# Patient Record
Sex: MALE | Race: WHITE | NOT HISPANIC OR LATINO | URBAN - METROPOLITAN AREA
[De-identification: names, ages, dates, MRNs, and addresses within clinical notes are randomized per-mention and may not be internally consistent; named-entity substitution may affect disease eponyms.]

---

## 2020-02-03 ENCOUNTER — OFFICE VISIT CONVERTED (OUTPATIENT)
Dept: FAMILY MEDICINE CLINIC | Facility: CLINIC | Age: 60
End: 2020-02-03
Attending: NURSE PRACTITIONER

## 2021-05-15 VITALS
HEIGHT: 70 IN | OXYGEN SATURATION: 94 % | TEMPERATURE: 97.9 F | DIASTOLIC BLOOD PRESSURE: 108 MMHG | HEART RATE: 76 BPM | WEIGHT: 294.25 LBS | BODY MASS INDEX: 42.13 KG/M2 | SYSTOLIC BLOOD PRESSURE: 180 MMHG

## 2024-04-29 ENCOUNTER — OFFICE VISIT (OUTPATIENT)
Dept: FAMILY MEDICINE CLINIC | Facility: CLINIC | Age: 64
End: 2024-04-29
Payer: MEDICAID

## 2024-04-29 VITALS
TEMPERATURE: 97.9 F | HEART RATE: 52 BPM | HEIGHT: 70 IN | BODY MASS INDEX: 30.21 KG/M2 | OXYGEN SATURATION: 96 % | WEIGHT: 211 LBS | SYSTOLIC BLOOD PRESSURE: 154 MMHG | DIASTOLIC BLOOD PRESSURE: 76 MMHG

## 2024-04-29 DIAGNOSIS — M54.50 LOW BACK PAIN, UNSPECIFIED BACK PAIN LATERALITY, UNSPECIFIED CHRONICITY, UNSPECIFIED WHETHER SCIATICA PRESENT: ICD-10-CM

## 2024-04-29 DIAGNOSIS — K59.09 CHRONIC CONSTIPATION: ICD-10-CM

## 2024-04-29 DIAGNOSIS — Z76.89 ENCOUNTER TO ESTABLISH CARE: ICD-10-CM

## 2024-04-29 DIAGNOSIS — M51.36 DDD (DEGENERATIVE DISC DISEASE), LUMBAR: ICD-10-CM

## 2024-04-29 DIAGNOSIS — E78.2 MIXED HYPERLIPIDEMIA: ICD-10-CM

## 2024-04-29 DIAGNOSIS — I10 HYPERTENSION, UNSPECIFIED TYPE: Primary | ICD-10-CM

## 2024-04-29 PROCEDURE — 99204 OFFICE O/P NEW MOD 45 MIN: CPT | Performed by: STUDENT IN AN ORGANIZED HEALTH CARE EDUCATION/TRAINING PROGRAM

## 2024-04-29 PROCEDURE — 1159F MED LIST DOCD IN RCRD: CPT | Performed by: STUDENT IN AN ORGANIZED HEALTH CARE EDUCATION/TRAINING PROGRAM

## 2024-04-29 PROCEDURE — 1160F RVW MEDS BY RX/DR IN RCRD: CPT | Performed by: STUDENT IN AN ORGANIZED HEALTH CARE EDUCATION/TRAINING PROGRAM

## 2024-04-29 RX ORDER — NEBIVOLOL 10 MG/1
10 TABLET ORAL DAILY
COMMUNITY
End: 2024-04-29 | Stop reason: SDUPTHER

## 2024-04-29 RX ORDER — VALSARTAN 80 MG/1
80 TABLET ORAL DAILY
Qty: 30 TABLET | Refills: 1 | Status: SHIPPED | OUTPATIENT
Start: 2024-04-29 | End: 2024-05-29

## 2024-04-29 RX ORDER — POLYETHYLENE GLYCOL 3350 17 G/17G
17 POWDER, FOR SOLUTION ORAL DAILY PRN
Qty: 527 G | Refills: 1 | Status: SHIPPED | OUTPATIENT
Start: 2024-04-29 | End: 2024-05-02 | Stop reason: SDUPTHER

## 2024-04-29 RX ORDER — CYCLOBENZAPRINE HCL 5 MG
5 TABLET ORAL NIGHTLY PRN
Qty: 30 TABLET | Refills: 0 | Status: SHIPPED | OUTPATIENT
Start: 2024-04-29

## 2024-04-29 RX ORDER — ROSUVASTATIN CALCIUM 40 MG/1
40 TABLET, COATED ORAL NIGHTLY
COMMUNITY
Start: 2024-04-05 | End: 2024-04-29 | Stop reason: SDUPTHER

## 2024-04-29 RX ORDER — ROSUVASTATIN CALCIUM 40 MG/1
40 TABLET, COATED ORAL NIGHTLY
Qty: 90 TABLET | Refills: 1 | Status: SHIPPED | OUTPATIENT
Start: 2024-04-29 | End: 2024-07-28

## 2024-04-29 RX ORDER — NEBIVOLOL 10 MG/1
10 TABLET ORAL DAILY
Qty: 90 TABLET | Refills: 1 | Status: SHIPPED | OUTPATIENT
Start: 2024-04-29 | End: 2024-07-28

## 2024-04-29 NOTE — PROGRESS NOTES
"Chief Complaint  Establish Care (New patient is here for back pain ) and Constipation (Patient states he has been constipated since the back pain start a week ago. Intermittent h/0 hemmorides and denies no blood in stooles.)    Subjective      Constipation        José Antonio Rabago is a 63 y.o. male who presents to [facility] with PMH of HLD, HTN, presents to establish care.  Pt states he had a MI back in 2001, and has been on the current medication list since than.  Patient complains of lumbago he has had multiple back surgeries and discectomies, the last one being in 2020.  He also complains of recent onset constipation he was previously on MiraLAX but has not been on it for over 6 months.        Objective   Vital Signs:   Vitals:    04/29/24 1341   BP: 154/76   BP Location: Left arm   Patient Position: Sitting   Cuff Size: Adult   Pulse: 52   Temp: 97.9 °F (36.6 °C)   TempSrc: Oral   SpO2: 96%   Weight: 95.7 kg (211 lb)   Height: 177.8 cm (70\")   PainSc:   7   PainLoc: Abdomen     Body mass index is 30.28 kg/m².    Wt Readings from Last 3 Encounters:   04/29/24 95.7 kg (211 lb)   02/03/20 133 kg (294 lb 4 oz)     BP Readings from Last 3 Encounters:   04/29/24 154/76   02/03/20 (!) 180/108       Health Maintenance   Topic Date Due    COLORECTAL CANCER SCREENING  Never done    TDAP/TD VACCINES (1 - Tdap) Never done    RSV Vaccine - Adults (1 - 1-dose 60+ series) Never done    COVID-19 Vaccine (5 - 2023-24 season) 09/01/2023    LIPID PANEL  01/23/2024    HEPATITIS C SCREENING  Never done    ANNUAL PHYSICAL  Never done    INFLUENZA VACCINE  08/01/2024    BMI FOLLOWUP  04/29/2025    ZOSTER VACCINE  Completed    Pneumococcal Vaccine 0-64  Aged Out       Physical Exam  Constitutional:       General: He is not in acute distress.     Appearance: Normal appearance. He is not toxic-appearing.   HENT:      Head: Normocephalic and atraumatic.      Right Ear: Tympanic membrane normal.      Left " Ear: Tympanic membrane normal.      Nose: Nose normal.      Mouth/Throat:      Mouth: Mucous membranes are moist.   Eyes:      General: No scleral icterus.     Extraocular Movements: Extraocular movements intact.      Conjunctiva/sclera: Conjunctivae normal.      Pupils: Pupils are equal, round, and reactive to light.   Cardiovascular:      Rate and Rhythm: Normal rate and regular rhythm.      Pulses: Normal pulses.      Heart sounds: Normal heart sounds. No murmur heard.     No gallop.   Pulmonary:      Effort: Pulmonary effort is normal. No respiratory distress.   Abdominal:      General: Abdomen is flat. Bowel sounds are normal. There is no distension.      Palpations: Abdomen is soft.   Musculoskeletal:         General: Normal range of motion.      Cervical back: Normal range of motion.   Skin:     General: Skin is warm and dry.      Capillary Refill: Capillary refill takes less than 2 seconds.   Neurological:      General: No focal deficit present.      Mental Status: He is alert.   Psychiatric:         Mood and Affect: Mood normal.          Result Review :     The following data was reviewed by: Darren Pascal MD on 04/29/2024:      Procedures          Diagnoses and all orders for this visit:    1. Hypertension, unspecified type (Primary)  -     nebivolol (BYSTOLIC) 10 MG tablet; Take 1 tablet by mouth Daily for 90 days.  Dispense: 90 tablet; Refill: 1  -     valsartan (Diovan) 80 MG tablet; Take 1 tablet by mouth Daily for 30 days.  Dispense: 30 tablet; Refill: 1    2. Mixed hyperlipidemia  -     rosuvastatin (CRESTOR) 40 MG tablet; Take 1 tablet by mouth Every Night for 90 days.  Dispense: 90 tablet; Refill: 1    3. Encounter to establish care    4. DDD (degenerative disc disease), lumbar    5. Low back pain, unspecified back pain laterality, unspecified chronicity, unspecified whether sciatica present  -     cyclobenzaprine (FLEXERIL) 5 MG tablet; Take 1 tablet by mouth At Night As Needed for Muscle Spasms.   Dispense: 30 tablet; Refill: 0    6. Chronic constipation  -     polyethylene glycol (GlycoLax) 17 GM/SCOOP powder; Take 17 g by mouth Daily As Needed (constipation).  Dispense: 527 g; Refill: 1    Start Diovan we will see him back in 4 weeks for annual physical and labs.    BMI is >= 30 and <35. (Class 1 Obesity). The following options were offered after discussion;: weight loss educational material (shared in after visit summary), exercise counseling/recommendations, nutrition counseling/recommendations, and pharmacological intervention options         FOLLOW UP  Return in about 4 weeks (around 5/27/2024).  Patient was given instructions and counseling regarding his condition or for health maintenance advice. Please see specific information pulled into the AVS if appropriate.       Darren Pascal MD  04/29/24  14:12 EDT    CURRENT & DISCONTINUED MEDICATIONS  Current Outpatient Medications   Medication Instructions    cyclobenzaprine (FLEXERIL) 5 mg, Oral, Nightly PRN    LOW-DOSE ASPIRIN PO 81 mg, Oral, Daily    nebivolol (BYSTOLIC) 10 mg, Oral, Daily    polyethylene glycol (GLYCOLAX) 17 g, Oral, Daily PRN    rosuvastatin (CRESTOR) 40 mg, Oral, Nightly    valsartan (DIOVAN) 80 mg, Oral, Daily       Medications Discontinued During This Encounter   Medication Reason    nebivolol (BYSTOLIC) 10 MG tablet Reorder    rosuvastatin (CRESTOR) 40 MG tablet Reorder       need for outpatient follow-up/return to ED if symptoms worsen, persist or questions arise/radiology results

## 2024-05-02 ENCOUNTER — TELEPHONE (OUTPATIENT)
Dept: FAMILY MEDICINE CLINIC | Facility: CLINIC | Age: 64
End: 2024-05-02
Payer: MEDICAID

## 2024-05-02 DIAGNOSIS — K59.09 CHRONIC CONSTIPATION: ICD-10-CM

## 2024-05-02 RX ORDER — POLYETHYLENE GLYCOL 3350 17 G/17G
17 POWDER, FOR SOLUTION ORAL DAILY PRN
Qty: 527 G | Refills: 1 | Status: SHIPPED | OUTPATIENT
Start: 2024-05-02

## 2024-05-02 NOTE — TELEPHONE ENCOUNTER
Caller: José Antonio Rabago    Relationship: Self    Best call back number: [phone]    Which medication are you concerned about: polyethylene glycol (GlycoLax) 17 GM/SCOOP powder     Who prescribed you this medication: MD JARRELL     When did you start taking this medication: 2 DAYS AGO    What are your concerns: PATIENT STATED THIS IS CAUSING HIM TO HAVE CRAMPING AND ISN'T HELPING HIM PASS BOWELS. WOULD LIKE A DIFFERENT MEDICATION.

## 2024-05-15 ENCOUNTER — OFFICE VISIT (OUTPATIENT)
Dept: FAMILY MEDICINE CLINIC | Facility: CLINIC | Age: 64
End: 2024-05-15
Payer: MEDICAID

## 2024-05-15 VITALS
HEIGHT: 70 IN | OXYGEN SATURATION: 98 % | SYSTOLIC BLOOD PRESSURE: 130 MMHG | BODY MASS INDEX: 29.2 KG/M2 | DIASTOLIC BLOOD PRESSURE: 86 MMHG | HEART RATE: 86 BPM | TEMPERATURE: 97.7 F | WEIGHT: 204 LBS

## 2024-05-15 DIAGNOSIS — Z13.220 LIPID SCREENING: ICD-10-CM

## 2024-05-15 DIAGNOSIS — K59.09 CHRONIC CONSTIPATION: ICD-10-CM

## 2024-05-15 DIAGNOSIS — Z12.11 COLON CANCER SCREENING: ICD-10-CM

## 2024-05-15 DIAGNOSIS — R10.13 EPIGASTRIC ABDOMINAL PAIN: ICD-10-CM

## 2024-05-15 DIAGNOSIS — Z00.00 ANNUAL PHYSICAL EXAM: Primary | ICD-10-CM

## 2024-05-15 DIAGNOSIS — E78.2 MIXED HYPERLIPIDEMIA: ICD-10-CM

## 2024-05-15 DIAGNOSIS — Z11.59 NEED FOR HEPATITIS C SCREENING TEST: ICD-10-CM

## 2024-05-15 LAB
ALBUMIN SERPL-MCNC: 4.9 G/DL (ref 3.5–5.2)
ALBUMIN/GLOB SERPL: 1.6 G/DL
ALP SERPL-CCNC: 62 U/L (ref 39–117)
ALT SERPL W P-5'-P-CCNC: 21 U/L (ref 1–41)
ANION GAP SERPL CALCULATED.3IONS-SCNC: 10 MMOL/L (ref 5–15)
AST SERPL-CCNC: 22 U/L (ref 1–40)
BASOPHILS # BLD AUTO: 0.05 10*3/MM3 (ref 0–0.2)
BASOPHILS NFR BLD AUTO: 0.9 % (ref 0–1.5)
BILIRUB SERPL-MCNC: 0.7 MG/DL (ref 0–1.2)
BUN SERPL-MCNC: 9 MG/DL (ref 8–23)
BUN/CREAT SERPL: 10.7 (ref 7–25)
CALCIUM SPEC-SCNC: 9.8 MG/DL (ref 8.6–10.5)
CHLORIDE SERPL-SCNC: 102 MMOL/L (ref 98–107)
CHOLEST SERPL-MCNC: 114 MG/DL (ref 0–200)
CO2 SERPL-SCNC: 26 MMOL/L (ref 22–29)
CREAT SERPL-MCNC: 0.84 MG/DL (ref 0.76–1.27)
DEPRECATED RDW RBC AUTO: 38.6 FL (ref 37–54)
EGFRCR SERPLBLD CKD-EPI 2021: 98 ML/MIN/1.73
EOSINOPHIL # BLD AUTO: 0.07 10*3/MM3 (ref 0–0.4)
EOSINOPHIL NFR BLD AUTO: 1.2 % (ref 0.3–6.2)
ERYTHROCYTE [DISTWIDTH] IN BLOOD BY AUTOMATED COUNT: 12.4 % (ref 12.3–15.4)
GLOBULIN UR ELPH-MCNC: 3.1 GM/DL
GLUCOSE SERPL-MCNC: 101 MG/DL (ref 65–99)
HCT VFR BLD AUTO: 42.8 % (ref 37.5–51)
HCV AB SER QL: NORMAL
HDLC SERPL-MCNC: 44 MG/DL (ref 40–60)
HGB BLD-MCNC: 14.4 G/DL (ref 13–17.7)
IMM GRANULOCYTES # BLD AUTO: 0.01 10*3/MM3 (ref 0–0.05)
IMM GRANULOCYTES NFR BLD AUTO: 0.2 % (ref 0–0.5)
LDLC SERPL CALC-MCNC: 52 MG/DL (ref 0–100)
LDLC/HDLC SERPL: 1.16 {RATIO}
LYMPHOCYTES # BLD AUTO: 1.2 10*3/MM3 (ref 0.7–3.1)
LYMPHOCYTES NFR BLD AUTO: 20.7 % (ref 19.6–45.3)
MCH RBC QN AUTO: 29.1 PG (ref 26.6–33)
MCHC RBC AUTO-ENTMCNC: 33.6 G/DL (ref 31.5–35.7)
MCV RBC AUTO: 86.6 FL (ref 79–97)
MONOCYTES # BLD AUTO: 0.32 10*3/MM3 (ref 0.1–0.9)
MONOCYTES NFR BLD AUTO: 5.5 % (ref 5–12)
NEUTROPHILS NFR BLD AUTO: 4.16 10*3/MM3 (ref 1.7–7)
NEUTROPHILS NFR BLD AUTO: 71.5 % (ref 42.7–76)
NRBC BLD AUTO-RTO: 0 /100 WBC (ref 0–0.2)
PLATELET # BLD AUTO: 186 10*3/MM3 (ref 140–450)
PMV BLD AUTO: 11.3 FL (ref 6–12)
POTASSIUM SERPL-SCNC: 4.4 MMOL/L (ref 3.5–5.2)
PROT SERPL-MCNC: 8 G/DL (ref 6–8.5)
RBC # BLD AUTO: 4.94 10*6/MM3 (ref 4.14–5.8)
SODIUM SERPL-SCNC: 138 MMOL/L (ref 136–145)
TRIGL SERPL-MCNC: 94 MG/DL (ref 0–150)
TSH SERPL DL<=0.05 MIU/L-ACNC: 0.48 UIU/ML (ref 0.27–4.2)
VLDLC SERPL-MCNC: 18 MG/DL (ref 5–40)
WBC NRBC COR # BLD AUTO: 5.81 10*3/MM3 (ref 3.4–10.8)

## 2024-05-15 PROCEDURE — 2014F MENTAL STATUS ASSESS: CPT | Performed by: STUDENT IN AN ORGANIZED HEALTH CARE EDUCATION/TRAINING PROGRAM

## 2024-05-15 PROCEDURE — 1159F MED LIST DOCD IN RCRD: CPT | Performed by: STUDENT IN AN ORGANIZED HEALTH CARE EDUCATION/TRAINING PROGRAM

## 2024-05-15 PROCEDURE — 1125F AMNT PAIN NOTED PAIN PRSNT: CPT | Performed by: STUDENT IN AN ORGANIZED HEALTH CARE EDUCATION/TRAINING PROGRAM

## 2024-05-15 PROCEDURE — 86803 HEPATITIS C AB TEST: CPT | Performed by: STUDENT IN AN ORGANIZED HEALTH CARE EDUCATION/TRAINING PROGRAM

## 2024-05-15 PROCEDURE — 85025 COMPLETE CBC W/AUTO DIFF WBC: CPT | Performed by: STUDENT IN AN ORGANIZED HEALTH CARE EDUCATION/TRAINING PROGRAM

## 2024-05-15 PROCEDURE — 80061 LIPID PANEL: CPT | Performed by: STUDENT IN AN ORGANIZED HEALTH CARE EDUCATION/TRAINING PROGRAM

## 2024-05-15 PROCEDURE — 36415 COLL VENOUS BLD VENIPUNCTURE: CPT | Performed by: STUDENT IN AN ORGANIZED HEALTH CARE EDUCATION/TRAINING PROGRAM

## 2024-05-15 PROCEDURE — 99396 PREV VISIT EST AGE 40-64: CPT | Performed by: STUDENT IN AN ORGANIZED HEALTH CARE EDUCATION/TRAINING PROGRAM

## 2024-05-15 PROCEDURE — 80053 COMPREHEN METABOLIC PANEL: CPT | Performed by: STUDENT IN AN ORGANIZED HEALTH CARE EDUCATION/TRAINING PROGRAM

## 2024-05-15 PROCEDURE — 1160F RVW MEDS BY RX/DR IN RCRD: CPT | Performed by: STUDENT IN AN ORGANIZED HEALTH CARE EDUCATION/TRAINING PROGRAM

## 2024-05-15 PROCEDURE — 84443 ASSAY THYROID STIM HORMONE: CPT | Performed by: STUDENT IN AN ORGANIZED HEALTH CARE EDUCATION/TRAINING PROGRAM

## 2024-05-15 NOTE — PROGRESS NOTES
"Chief Complaint  Abdominal Pain (Had hernia surgery about 10 years ago and is having that pain in the area of where they did the surgery. His back was bothering him and he was told to start doing abdominal exercises for the back thinks he might have pulled something from surgery) and Bloated    Subjective      History of Present Illness    José Antonio Rabago is a 63 y.o. male who presents to [facility]   With recent complain of constipation on and off for sx months presents for followup today, he has used enema, and suppositories. He was started on miralax for about week and he complained of abdominal pain.  Patient has a history of abdominal umbilical hernia that was repaired in 2015 via mesh.  He complains today of some epigastric pain that started after he had his enema and a large bowel movement.  He also reports while taking polyethylene glycol he had some pain after taking it and the pain is mainly in the epigastric region.  He denies any nausea vomiting fever body aches at this time.      Objective   Vital Signs:   Vitals:    05/15/24 0740   BP: 130/86   Pulse: 86   Temp: 97.7 °F (36.5 °C)   SpO2: 98%   Weight: 92.5 kg (204 lb)   Height: 177.8 cm (70\")     Body mass index is 29.27 kg/m².    Wt Readings from Last 3 Encounters:   05/15/24 92.5 kg (204 lb)   04/29/24 95.7 kg (211 lb)   02/03/20 133 kg (294 lb 4 oz)     BP Readings from Last 3 Encounters:   05/15/24 130/86   04/29/24 154/76   02/03/20 (!) 180/108       Health Maintenance   Topic Date Due    COLORECTAL CANCER SCREENING  Never done    LIPID PANEL  01/23/2024    HEPATITIS C SCREENING  Never done    ANNUAL PHYSICAL  Never done    COVID-19 Vaccine (5 - 2023-24 season) 05/17/2024 (Originally 9/1/2023)    RSV Vaccine - Adults (1 - 1-dose 60+ series) 05/15/2025 (Originally 8/2/2020)    TDAP/TD VACCINES (1 - Tdap) 05/15/2025 (Originally 8/2/1979)    INFLUENZA VACCINE  08/01/2024    BMI FOLLOWUP  04/29/2025    ZOSTER " VACCINE  Completed    Pneumococcal Vaccine 0-64  Aged Out       Physical Exam   General:  AAO x3, no acute distress.  Eyes:  EOMI, PERRLA  Ears/Nose/Mouth/Throat:  Moist mucous membranes  Respiratory:  CTA bilaterally, no wheezes rales or rhonchi  Cardiovascular:  RRR no murmur rubs or gallops  Gastrointestinal: Tender in the epigastric region today, no rebound no rigidity abdomen soft nondistended nontender bowel sounds present x4 quadrants  Musculoskeletal:  Moves all 4 extremities spontaneously, no apparent weakness  Skin:  Warm, dry, no skin rashes or lesions  Neurologic:  AAO x3, cranial nerves 2-12 grossly intact, no focal neuro deficits  Psychiatric:  Normal mood and affect    Result Review :     The following data was reviewed by: Darren Pascal MD on 05/15/2024:      Procedures          Diagnoses and all orders for this visit:    1. Annual physical exam (Primary)  -     Comprehensive Metabolic Panel  -     CBC & Differential  -     TSH  -     Lipid Panel    2. Chronic constipation  -     linaclotide (Linzess) 145 MCG capsule capsule; Take 1 capsule by mouth Every Morning Before Breakfast.  Dispense: 30 capsule; Refill: 5    3. Lipid screening  -     Lipid Panel    4. Colon cancer screening  -     Ambulatory Referral For Screening Colonoscopy    5. Need for hepatitis C screening test  -     Hepatitis C antibody; Future  -     Hepatitis C antibody    6. Mixed hyperlipidemia  -     Lipid Panel    7. Epigastric abdominal pain  -     CT Abdomen Pelvis Without Contrast; Future       Obtain CT abdomen pelvis due to tenderness in the epigastrium.  Patient continues to have some constipation intermittently we will go ahead and prescribe Linzess because he cannot tolerate polyethylene glycol and is already tried over-the-counter bisacodyl suppositories.            FOLLOW UP  No follow-ups on file.  Patient was given instructions and counseling regarding his condition or for health maintenance advice. Please see  specific information pulled into the AVS if appropriate.       Darren Pascal MD  05/15/24  09:31 EDT    CURRENT & DISCONTINUED MEDICATIONS  Current Outpatient Medications   Medication Instructions    cyclobenzaprine (FLEXERIL) 5 mg, Oral, Nightly PRN    linaclotide (LINZESS) 145 mcg, Oral, Every Morning Before Breakfast    LOW-DOSE ASPIRIN PO 81 mg, Oral, Daily    nebivolol (BYSTOLIC) 10 mg, Oral, Daily    polyethylene glycol (GLYCOLAX) 17 g, Oral, Daily PRN    rosuvastatin (CRESTOR) 40 mg, Oral, Nightly    valsartan (DIOVAN) 80 mg, Oral, Daily       There are no discontinued medications.   Answers submitted by the patient for this visit:  Primary Reason for Visit (Submitted on 5/14/2024)  What is the primary reason for your visit?: Abdominal Pain

## 2024-05-21 ENCOUNTER — PRIOR AUTHORIZATION (OUTPATIENT)
Dept: FAMILY MEDICINE CLINIC | Facility: CLINIC | Age: 64
End: 2024-05-21
Payer: MEDICAID

## 2024-05-21 ENCOUNTER — TELEPHONE (OUTPATIENT)
Dept: GASTROENTEROLOGY | Facility: CLINIC | Age: 64
End: 2024-05-21
Payer: MEDICAID

## 2024-05-21 NOTE — TELEPHONE ENCOUNTER
GLENDA AGUILA SENT TO INS. - waiting on outcome                    5/21/2024  Denied today  This request has not been approved. Based on the information sent for review, the requested drug did not meet our guideline rules. To get the request approved, your doctor needs to show that you have met the guideline rules below. If you have questions, please call your doctor. In some cases, the requested drug or alternatives offered may have other guideline rules that need to be met. Our guideline named GI MOTILITY AGENTS requires the following rule(s) be met for approval: The member has ONE of the following diagnoses: 1. Chronic idiopathic constipation (CIC) [a type of digestive disorder]. 2. Irritable bowel syndrome with constipation (IBS-C) [a type of bowel disease]. 3. Functional constipation.Your doctor told us you have a diagnosis of constipation (difficulty passing stool. We do not have records or chart notes showing you have one of the diagnoses listed above. This is why your request is denied. Please work with your provider to use a different medication or get us more information if it will allow us to approve this request. A written notification letter will follow with additional details.

## 2024-05-21 NOTE — TELEPHONE ENCOUNTER
Hub staff attempted to follow warm transfer process and was unsuccessful     Caller: José Antonio Rabago    Relationship to patient: Self    Best call back number: [phone]    Patient is needing: PT CALLED TO SCHEDULE FROM REFERRAL// PLEASE CLL PT BACK

## 2024-05-23 ENCOUNTER — PREP FOR SURGERY (OUTPATIENT)
Dept: OTHER | Facility: HOSPITAL | Age: 64
End: 2024-05-23
Payer: MEDICAID

## 2024-05-23 ENCOUNTER — CLINICAL SUPPORT (OUTPATIENT)
Dept: GASTROENTEROLOGY | Facility: CLINIC | Age: 64
End: 2024-05-23
Payer: MEDICAID

## 2024-05-23 DIAGNOSIS — Z12.11 SCREENING FOR MALIGNANT NEOPLASM OF COLON: Primary | ICD-10-CM

## 2024-05-23 NOTE — PROGRESS NOTES
José Antonio Rabago  1960  63 y.o.    Reason for call: Screening Colonoscopy    Prep prescribed: Chon --- Patient reports issues with constipation (his PCP is managing). I have mailed him low-residue diet, tips to make bowel prep more tolerable and our 4L bowel prep instructions.     Prep instructions reviewed with patient and sent to patient via regular mail to the home address on file    Clearance needed? No    Is the patient currently on any injectable medications for weight loss or diabetes? No      Family history of colon cancer? No  If yes, indicate relative:     The patient has been scheduled for: Colonoscopy  Procedure Date: 7.24.24 --- pt prefers to be added to our cancellation list  Family History   Problem Relation Age of Onset    Cancer Mother         Passed in 1985 from Cancer    COPD Mother         Heavy smoker    Colon cancer Neg Hx      Past Medical History:   Diagnosis Date    Allergic     Penicillin and Sulfa    Coronary artery disease 2000    Heart Attack 1999    Hyperlipidemia 2000    Hypertension 2000    Low back pain 1991    Discectomy Surgery in 1991 and 2000    Myocardial infarction 2001    Neuromuscular disorder 2005    Right shoulder and neck nerve damage    Obesity 2008    Weight reached 322lbs. Now @212     Allergies   Allergen Reactions    Penicillins Anaphylaxis    Lisinopril Other (See Comments) and Palpitations     Unstable B/P.    Hydralazine Other (See Comments)     CAUSED ELEVATED HEART RATE AND BP    Hydrochlorothiazide Swelling    Sulfa Antibiotics Rash     Past Surgical History:   Procedure Laterality Date    HERNIA REPAIR  2006    Hernia Surgery w/mesh repair    INGUINAL HERNIA REPAIR  2006    SPINE SURGERY  1991/2000    UMBILICAL HERNIA REPAIR  2006    Surgery w/ mesh     Social History     Socioeconomic History    Marital status:    Tobacco Use    Smoking status: Never    Smokeless tobacco: Never   Vaping Use    Vaping status: Never Used   Substance and Sexual  Activity    Alcohol use: Yes     Alcohol/week: 3.0 standard drinks of alcohol     Types: 3 Cans of beer per week     Comment: Drink a couple of beers occasionally    Drug use: Never    Sexual activity: Not Currently     Partners: Female     Birth control/protection: Other       Current Outpatient Medications:     cyclobenzaprine (FLEXERIL) 5 MG tablet, Take 1 tablet by mouth At Night As Needed for Muscle Spasms., Disp: 30 tablet, Rfl: 0    LOW-DOSE ASPIRIN PO, Take 81 mg by mouth Daily., Disp: , Rfl:     nebivolol (BYSTOLIC) 10 MG tablet, Take 1 tablet by mouth Daily for 90 days., Disp: 90 tablet, Rfl: 1    rosuvastatin (CRESTOR) 40 MG tablet, Take 1 tablet by mouth Every Night for 90 days., Disp: 90 tablet, Rfl: 1    linaclotide (Linzess) 145 MCG capsule capsule, Take 1 capsule by mouth Every Morning Before Breakfast. (Patient not taking: Reported on 5/23/2024), Disp: 30 capsule, Rfl: 5    polyethylene glycol (GlycoLax) 17 GM/SCOOP powder, Take 17 g by mouth Daily As Needed (constipation). (Patient not taking: Reported on 5/15/2024), Disp: 527 g, Rfl: 1    valsartan (Diovan) 80 MG tablet, Take 1 tablet by mouth Daily for 30 days. (Patient not taking: Reported on 5/23/2024), Disp: 30 tablet, Rfl: 1

## 2024-05-24 ENCOUNTER — HOSPITAL ENCOUNTER (OUTPATIENT)
Dept: CT IMAGING | Facility: HOSPITAL | Age: 64
Discharge: HOME | End: 2024-05-24
Payer: MEDICAID

## 2024-05-24 DIAGNOSIS — R10.13 EPIGASTRIC ABDOMINAL PAIN: ICD-10-CM

## 2024-05-24 PROCEDURE — 74176 CT ABD & PELVIS W/O CONTRAST: CPT

## 2024-06-06 ENCOUNTER — OFFICE VISIT (OUTPATIENT)
Dept: FAMILY MEDICINE CLINIC | Facility: CLINIC | Age: 64
End: 2024-06-06
Payer: MEDICAID

## 2024-06-06 VITALS
OXYGEN SATURATION: 98 % | TEMPERATURE: 97.5 F | DIASTOLIC BLOOD PRESSURE: 86 MMHG | WEIGHT: 202.4 LBS | HEART RATE: 56 BPM | BODY MASS INDEX: 28.98 KG/M2 | SYSTOLIC BLOOD PRESSURE: 146 MMHG | RESPIRATION RATE: 16 BRPM | HEIGHT: 70 IN

## 2024-06-06 DIAGNOSIS — R10.13 EPIGASTRIC PAIN: ICD-10-CM

## 2024-06-06 DIAGNOSIS — K59.09 CHRONIC CONSTIPATION: ICD-10-CM

## 2024-06-06 DIAGNOSIS — R91.1 RIGHT LOWER LOBE PULMONARY NODULE: Primary | ICD-10-CM

## 2024-06-06 DIAGNOSIS — K21.9 GASTROESOPHAGEAL REFLUX DISEASE WITHOUT ESOPHAGITIS: ICD-10-CM

## 2024-06-06 PROCEDURE — 1160F RVW MEDS BY RX/DR IN RCRD: CPT | Performed by: STUDENT IN AN ORGANIZED HEALTH CARE EDUCATION/TRAINING PROGRAM

## 2024-06-06 PROCEDURE — 99214 OFFICE O/P EST MOD 30 MIN: CPT | Performed by: STUDENT IN AN ORGANIZED HEALTH CARE EDUCATION/TRAINING PROGRAM

## 2024-06-06 PROCEDURE — 1125F AMNT PAIN NOTED PAIN PRSNT: CPT | Performed by: STUDENT IN AN ORGANIZED HEALTH CARE EDUCATION/TRAINING PROGRAM

## 2024-06-06 PROCEDURE — 1159F MED LIST DOCD IN RCRD: CPT | Performed by: STUDENT IN AN ORGANIZED HEALTH CARE EDUCATION/TRAINING PROGRAM

## 2024-06-06 RX ORDER — PANTOPRAZOLE SODIUM 40 MG/1
40 TABLET, DELAYED RELEASE ORAL DAILY
Qty: 90 TABLET | Refills: 0 | Status: SHIPPED | OUTPATIENT
Start: 2024-06-06 | End: 2024-09-04

## 2024-06-06 NOTE — PROGRESS NOTES
"Chief Complaint  Follow-up (On CT scan )    Subjective      History of Present Illness    José Antonio Rabago is a 63 y.o. male who presents to [facility]   With a result of constipation for the past 6 months he is currently scheduled for colonoscopy next month with Dr. Michael.  Patient reports his constipation is improving he reports some periumbilical pain and epigastric pain that is ongoing.  We did a CT of the pelvis which showed a 6 mm right lower lobe nodule he is concerned about that.  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  He states he is never smoked.  Has any recent night sweats, fevers body aches or unintentional weight loss.      Objective   Vital Signs:   Vitals:    06/06/24 0915   BP: 146/86   BP Location: Right arm   Patient Position: Sitting   Cuff Size: Adult   Pulse: 56   Resp: 16   Temp: 97.5 °F (36.4 °C)   TempSrc: Temporal   SpO2: 98%   Weight: 91.8 kg (202 lb 6.4 oz)   Height: 177.8 cm (70\")     Body mass index is 29.04 kg/m².    Wt Readings from Last 3 Encounters:   06/06/24 91.8 kg (202 lb 6.4 oz)   05/15/24 92.5 kg (204 lb)   04/29/24 95.7 kg (211 lb)     BP Readings from Last 3 Encounters:   06/06/24 146/86   05/15/24 130/86   04/29/24 154/76       Health Maintenance   Topic Date Due    COLORECTAL CANCER SCREENING  Never done    COVID-19 Vaccine (5 - 2023-24 season) 08/26/2024 (Originally 9/1/2023)    RSV Vaccine - Adults (1 - 1-dose 60+ series) 05/15/2025 (Originally 8/2/2020)    TDAP/TD VACCINES (1 - Tdap) 05/15/2025 (Originally 8/2/1979)    INFLUENZA VACCINE  08/01/2024    BMI FOLLOWUP  04/29/2025    ANNUAL PHYSICAL  05/15/2025    LIPID PANEL  05/15/2025    HEPATITIS C SCREENING  Completed    ZOSTER VACCINE  Completed    Pneumococcal Vaccine 0-64  Aged Out       Physical Exam   General:  AAO x3, no acute distress.  Eyes:  EOMI, PERRLA  Ears/Nose/Mouth/Throat:  Moist mucous " membranes  Respiratory:  CTA bilaterally, no wheezes rales or rhonchi  Cardiovascular:  RRR no murmur rubs or gallops  Gastrointestinal:  Abdomen soft nondistended nontender bowel sounds present x4 quadrants  Musculoskeletal:  Moves all 4 extremities spontaneously, no apparent weakness  Skin:  Warm, dry, no skin rashes or lesions  Neurologic:  AAO x3, cranial nerves 2-12 grossly intact, no focal neuro deficits  Psychiatric:  Normal mood and affect    Result Review :     The following data was reviewed by: Darren Pascal MD on 06/06/2024:      Procedures          Diagnoses and all orders for this visit:    1. Right lower lobe pulmonary nodule (Primary)  -     CT Chest Without Contrast; Future    2. Gastroesophageal reflux disease without esophagitis  -     pantoprazole (Protonix) 40 MG EC tablet; Take 1 tablet by mouth Daily for 90 days.  Dispense: 90 tablet; Refill: 0  -     Ambulatory Referral to Gastroenterology    3. Epigastric pain  -     Ambulatory Referral to Gastroenterology    4. Chronic constipation  -     Ambulatory Referral to Gastroenterology         And order a chest CT referral to GI sent for possible endoscopy due to ongoing GERD symptoms.  Will start her protonics today.         FOLLOW UP  Return in about 3 months (around 9/6/2024).  Patient was given instructions and counseling regarding his condition or for health maintenance advice. Please see specific information pulled into the AVS if appropriate.       Darren Pascal MD  06/06/24  10:00 EDT    CURRENT & DISCONTINUED MEDICATIONS  Current Outpatient Medications   Medication Instructions    cyclobenzaprine (FLEXERIL) 5 mg, Oral, Nightly PRN    linaclotide (LINZESS) 145 mcg, Oral, Every Morning Before Breakfast    LOW-DOSE ASPIRIN PO 81 mg, Oral, Daily    nebivolol (BYSTOLIC) 10 mg, Oral, Daily    pantoprazole (PROTONIX) 40 mg, Oral, Daily    polyethylene glycol (GLYCOLAX) 17 g, Oral, Daily PRN    polyethylene glycol (GoLYTELY) 236 g solution Take  per office instructions    rosuvastatin (CRESTOR) 40 mg, Oral, Nightly    valsartan (DIOVAN) 80 mg, Oral, Daily       There are no discontinued medications.  Return back to

## 2024-07-22 ENCOUNTER — TELEPHONE (OUTPATIENT)
Dept: GASTROENTEROLOGY | Facility: CLINIC | Age: 64
End: 2024-07-22
Payer: MEDICAID

## 2024-07-22 NOTE — TELEPHONE ENCOUNTER
Caller: José Antonio Rabago    Relationship to patient: Self    Best call back number: [phone]     Chief complaint: SCHEDULING CONFLICT    Type of visit: PROCEDURE--SCREENING    Requested date: NEXT AVAILABLE     If rescheduling, when is the original appointment: 7/24/24
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
## 2024-09-17 ENCOUNTER — TELEPHONE (OUTPATIENT)
Dept: GASTROENTEROLOGY | Facility: CLINIC | Age: 64
End: 2024-09-17

## 2024-11-08 DIAGNOSIS — E78.2 MIXED HYPERLIPIDEMIA: ICD-10-CM

## 2024-11-08 DIAGNOSIS — I10 HYPERTENSION, UNSPECIFIED TYPE: ICD-10-CM

## 2024-11-08 RX ORDER — ROSUVASTATIN CALCIUM 40 MG/1
40 TABLET, COATED ORAL NIGHTLY
Qty: 90 TABLET | Refills: 1 | Status: SHIPPED | OUTPATIENT
Start: 2024-11-08 | End: 2025-02-06

## 2024-11-08 RX ORDER — NEBIVOLOL 10 MG/1
10 TABLET ORAL DAILY
Qty: 90 TABLET | Refills: 1 | Status: SHIPPED | OUTPATIENT
Start: 2024-11-08 | End: 2025-02-06

## 2024-11-08 NOTE — TELEPHONE ENCOUNTER
Caller: José Antonio Rabago Sammy    Relationship: Self    Best call back number: [phone]     Requested Prescriptions:   Requested Prescriptions     Pending Prescriptions Disp Refills    rosuvastatin (CRESTOR) 40 MG tablet 90 tablet 1     Sig: Take 1 tablet by mouth Every Night for 90 days.    nebivolol (BYSTOLIC) 10 MG tablet 90 tablet 1     Sig: Take 1 tablet by mouth Daily for 90 days.        [facility] [address] - [phone]  - [phone] FX     Last office visit with prescribing clinician: 6/6/2024   Last telemedicine visit with prescribing clinician: Visit date not found   Next office visit with prescribing clinician: 12/9/2024     Does the patient have less than a 3 day supply:  [x] Yes  [] No    Riki Nelson Rep   11/08/24 12:20 EST

## 2025-01-22 ENCOUNTER — OFFICE VISIT (OUTPATIENT)
Dept: FAMILY MEDICINE CLINIC | Facility: CLINIC | Age: 65
End: 2025-01-22
Payer: COMMERCIAL

## 2025-01-22 VITALS
HEIGHT: 70 IN | BODY MASS INDEX: 28.92 KG/M2 | TEMPERATURE: 97.3 F | WEIGHT: 202 LBS | DIASTOLIC BLOOD PRESSURE: 80 MMHG | HEART RATE: 60 BPM | SYSTOLIC BLOOD PRESSURE: 130 MMHG | OXYGEN SATURATION: 99 %

## 2025-01-22 DIAGNOSIS — I10 HYPERTENSION, UNSPECIFIED TYPE: Primary | ICD-10-CM

## 2025-01-22 DIAGNOSIS — R91.1 PULMONARY NODULE: ICD-10-CM

## 2025-01-22 DIAGNOSIS — Z12.11 COLON CANCER SCREENING: ICD-10-CM

## 2025-01-22 DIAGNOSIS — Z23 NEED FOR INFLUENZA VACCINATION: ICD-10-CM

## 2025-01-22 DIAGNOSIS — E78.2 MIXED HYPERLIPIDEMIA: ICD-10-CM

## 2025-01-22 DIAGNOSIS — J01.10 ACUTE NON-RECURRENT FRONTAL SINUSITIS: ICD-10-CM

## 2025-01-22 PROCEDURE — 90471 IMMUNIZATION ADMIN: CPT | Performed by: STUDENT IN AN ORGANIZED HEALTH CARE EDUCATION/TRAINING PROGRAM

## 2025-01-22 PROCEDURE — 99214 OFFICE O/P EST MOD 30 MIN: CPT | Performed by: STUDENT IN AN ORGANIZED HEALTH CARE EDUCATION/TRAINING PROGRAM

## 2025-01-22 PROCEDURE — 90656 IIV3 VACC NO PRSV 0.5 ML IM: CPT | Performed by: STUDENT IN AN ORGANIZED HEALTH CARE EDUCATION/TRAINING PROGRAM

## 2025-01-22 RX ORDER — DOXYCYCLINE 100 MG/1
100 CAPSULE ORAL 2 TIMES DAILY
Qty: 10 CAPSULE | Refills: 0 | Status: SHIPPED | OUTPATIENT
Start: 2025-01-22 | End: 2025-01-27

## 2025-01-22 NOTE — PROGRESS NOTES
"Chief Complaint  Hypertension    Subjective          José Antonio Rabago presents to [facility]  Hypertension          History of Present Illness  The patient presents for evaluation of a sinus infection.    He has been experiencing rhinorrhea for approximately 3 weeks, which he attributes to a viral infection contracted from his nephew. This virus also caused diarrhea and a mild fever, which have since resolved. He reports no cough. He has been exposed to cold and dry air due to a malfunctioning furnace at his residence, which he believes may have contributed to the development of his sinus infection.    His colonoscopy was rejected by his insurance because they had dropped his insurance as he adopted his nephew and was receiving an extra $ 500 a month. He has insurance back now.    He does not require any medication refills at this time as he has sufficient supplies of rosuvastatin, Diovan, and Bystolic.    MEDICATIONS  Rosuvastatin, Diovan, Bystolic    IMMUNIZATIONS  He has received his influenza vaccine.      Current Outpatient Medications   Medication Instructions    cyclobenzaprine (FLEXERIL) 5 mg, Oral, Nightly PRN    doxycycline (VIBRAMYCIN) 100 mg, Oral, 2 Times Daily    LOW-DOSE ASPIRIN PO 81 mg, Daily    nebivolol (BYSTOLIC) 10 mg, Oral, Daily    rosuvastatin (CRESTOR) 40 mg, Oral, Nightly    valsartan (DIOVAN) 80 mg, Oral, Daily       The following portions of the patient's history were reviewed and updated as appropriate: allergies, current medications, past family history, past medical history, past social history, past surgical history, and problem list.    Objective   Vital Signs:   /80   Pulse 60   Temp 97.3 °F (36.3 °C)   Ht 177.8 cm (70\")   Wt 91.6 kg (202 lb)   SpO2 99%   BMI 28.98 kg/m²     BP Readings from Last 3 Encounters:   01/22/25 130/80   06/06/24 146/86   05/15/24 130/86     Wt Readings from Last 3 Encounters:   01/22/25 91.6 kg (202 lb) "   06/06/24 91.8 kg (202 lb 6.4 oz)   05/15/24 92.5 kg (204 lb)           Physical Exam  HENT:      Head: Normocephalic.      Right Ear: Tympanic membrane normal.      Left Ear: Tympanic membrane normal.      Nose: Congestion and rhinorrhea present.      Mouth/Throat:      Mouth: Mucous membranes are moist.      Pharynx: Oropharynx is clear. Posterior oropharyngeal erythema present. No oropharyngeal exudate.      Tonsils: 2+ on the right. 2+ on the left.   Eyes:      Extraocular Movements: Extraocular movements intact.      Conjunctiva/sclera: Conjunctivae normal.      Pupils: Pupils are equal, round, and reactive to light.   Cardiovascular:      Rate and Rhythm: Normal rate and regular rhythm.      Heart sounds: No murmur heard.  Pulmonary:      Effort: Pulmonary effort is normal.   Abdominal:      General: Abdomen is flat.   Musculoskeletal:      Cervical back: Normal range of motion.   Lymphadenopathy:      Cervical: Cervical adenopathy present.   Neurological:      Mental Status: He is alert.   Psychiatric:         Mood and Affect: Mood normal.            Result Review :   The following data was reviewed by: Darren Pascal MD on 01/22/2025:  Common labs          5/15/2024    08:21   Common Labs   Glucose 101    BUN 9    Creatinine 0.84    Sodium 138    Potassium 4.4    Chloride 102    Calcium 9.8    Albumin 4.9    Total Bilirubin 0.7    Alkaline Phosphatase 62    AST (SGOT) 22    ALT (SGPT) 21    WBC 5.81    Hemoglobin 14.4    Hematocrit 42.8    Platelets 186    Total Cholesterol 114    Triglycerides 94    HDL Cholesterol 44    LDL Cholesterol  52             Lab Results   Component Value Date    INR 0.95 (L) 10/27/2020    BILIRUBINUR Negative 04/10/2018       Procedures        Assessment and Plan    Diagnoses and all orders for this visit:    1. Hypertension, unspecified type (Primary)    2. Need for influenza vaccination  -     Fluzone >6mos ([id number])    3. Colon cancer screening  -     Cologuard - Stool, Per  Rectum; Future    4. Acute non-recurrent frontal sinusitis  -     doxycycline (VIBRAMYCIN) 100 MG capsule; Take 1 capsule by mouth 2 (Two) Times a Day for 5 days.  Dispense: 10 capsule; Refill: 0    5. Mixed hyperlipidemia          Assessment & Plan  1. Sinus infection.  He has been experiencing a runny nose for about 3 weeks, which has thickened recently. He reported a viral infection with symptoms including diarrhea and fever, which has since resolved but led to a sinus infection. An antibiotic prescription will be sent to his pharmacy to treat the sinus infection.    2. Health maintenance.  He is not due for annual physical until May 2025. A Cologuard test will be ordered as his insurance rejected the previous colonoscopy. He has received his influenza vaccine.    3. Medication management.  He confirmed that he has enough supply of rosuvastatin, Diovan, and Bystolic. He is advised to contact the office if he needs any refills before the next visit.    Follow-up  The patient will follow up in May 2025 for his annual physical examination.      Medications Discontinued During This Encounter   Medication Reason    linaclotide (Linzess) 145 MCG capsule capsule     polyethylene glycol (GoLYTELY) 236 g solution     polyethylene glycol (GlycoLax) 17 GM/SCOOP powder           Follow Up   Return in about 4 months (around 5/22/2025).  Patient was given instructions and counseling regarding his condition or for health maintenance advice. Please see specific information pulled into the AVS if appropriate.       Darren Pascal MD  01/22/25  08:51 EST      Patient or patient representative verbalized consent for the use of Ambient Listening during the visit with  Darren Pascal MD for chart documentation. 1/22/2025  08:48 EST

## 2025-01-27 ENCOUNTER — TELEPHONE (OUTPATIENT)
Dept: FAMILY MEDICINE CLINIC | Facility: CLINIC | Age: 65
End: 2025-01-27
Payer: COMMERCIAL

## 2025-01-27 NOTE — TELEPHONE ENCOUNTER
Caller: José Antonio Rabago    Relationship: Self    Best call back number: [phone]     What medication are you requesting: Z PACK    What are your current symptoms: SINUS INFECTION    How long have you been experiencing symptoms: A WEEK    Have you had these symptoms before:    [x] Yes  [] No    Have you been treated for these symptoms before:   [x] Yes  [] No    If a prescription is needed, what is your preferred pharmacy and phone number: [facility] [address] [phone] [facility] [phone] [facility]     Additional notes: PATIENT DID NOT RESPOND WELL WITH THE DOXYCYCLINE HE WAS PRESCRIBED AND WOULD LIKE AN ALTERNATIVE CALLED IN

## 2025-01-28 ENCOUNTER — TELEPHONE (OUTPATIENT)
Dept: FAMILY MEDICINE CLINIC | Facility: CLINIC | Age: 65
End: 2025-01-28

## 2025-01-28 DIAGNOSIS — J01.10 ACUTE NON-RECURRENT FRONTAL SINUSITIS: Primary | ICD-10-CM

## 2025-01-28 RX ORDER — AZITHROMYCIN 250 MG/1
TABLET, FILM COATED ORAL
Qty: 6 TABLET | Refills: 0 | Status: SHIPPED | OUTPATIENT
Start: 2025-01-28

## 2025-01-28 NOTE — TELEPHONE ENCOUNTER
Caller: José Antonio Rabago    Relationship: Self    Best call back number: [phone]      What medication are you requesting: Z -PACK     What are your current symptoms:  PAIN IN FACE, DISCHARGE, DRAINAGE AND HEADACHE     How long have you been experiencing symptoms:  3  WEEKS     Have you had these symptoms before:    [x] Yes  [] No    Have you been treated for these symptoms before:   [x] Yes  [] No    If a prescription is needed, what is your preferred pharmacy and phone number:      Additional notes:    THE PATIENT SAID HE WAS JUST SEEN BY PCP CHRYSTAL  LAST WEEK  ON 1/22/25. HE SAID THAT HE WAS GIVEN THE DOXYCYLINE FOR HIS SINUS ISSUES AND IT DID  NOT WORK WELL FOR HIM. HE SAID HE OLD PROVIDER GAVE HIM A  Z- PACK  THAT WORK REALLY GOOD .          PATIENT IS  REQUESTING A CALL TO ADVISE OF DECISION

## 2025-02-25 ENCOUNTER — TELEPHONE (OUTPATIENT)
Dept: FAMILY MEDICINE CLINIC | Facility: CLINIC | Age: 65
End: 2025-02-25
Payer: COMMERCIAL

## 2025-02-25 NOTE — TELEPHONE ENCOUNTER
Caller: TROY DUNAWAY/[facility]    Relationship to patient:     Best call back number: [phone]     Patient is needing: IMAGING DONE CT ABDOMEN AND PELVIS,   PULMONARY FINDING NOTED IN EXAM,     PLEASE OBTAIN COPY OF REPORT AND FOLLOW UP WITH PATIENT

## 2025-02-28 NOTE — TELEPHONE ENCOUNTER
"Relay     \"Spoke with pt and he said he couldn't talk right now he would give us a call back.\"                "

## 2025-04-15 DIAGNOSIS — I10 HYPERTENSION, UNSPECIFIED TYPE: ICD-10-CM

## 2025-04-15 DIAGNOSIS — E78.2 MIXED HYPERLIPIDEMIA: ICD-10-CM

## 2025-04-16 RX ORDER — ROSUVASTATIN CALCIUM 40 MG/1
40 TABLET, COATED ORAL NIGHTLY
Qty: 30 TABLET | Refills: 2 | Status: SHIPPED | OUTPATIENT
Start: 2025-04-16 | End: 2025-07-15

## 2025-04-16 RX ORDER — NEBIVOLOL 10 MG/1
10 TABLET ORAL DAILY
Qty: 30 TABLET | Refills: 0 | Status: SHIPPED | OUTPATIENT
Start: 2025-04-16 | End: 2025-05-16

## 2025-04-30 ENCOUNTER — OFFICE VISIT (OUTPATIENT)
Dept: FAMILY MEDICINE CLINIC | Facility: CLINIC | Age: 65
End: 2025-04-30
Payer: COMMERCIAL

## 2025-04-30 VITALS
OXYGEN SATURATION: 97 % | HEART RATE: 50 BPM | DIASTOLIC BLOOD PRESSURE: 76 MMHG | HEIGHT: 70 IN | TEMPERATURE: 97.9 F | SYSTOLIC BLOOD PRESSURE: 126 MMHG | WEIGHT: 237.8 LBS | BODY MASS INDEX: 34.04 KG/M2

## 2025-04-30 DIAGNOSIS — E78.2 MIXED HYPERLIPIDEMIA: Primary | ICD-10-CM

## 2025-04-30 DIAGNOSIS — I10 HYPERTENSION, UNSPECIFIED TYPE: ICD-10-CM

## 2025-04-30 PROBLEM — J01.90 ACUTE BACTERIAL SINUSITIS: Status: ACTIVE | Noted: 2018-08-21

## 2025-04-30 PROBLEM — K46.9 ABDOMINAL HERNIA: Status: ACTIVE | Noted: 2025-04-30

## 2025-04-30 PROBLEM — E78.00 HIGH CHOLESTEROL: Status: ACTIVE | Noted: 2025-04-30

## 2025-04-30 PROBLEM — B96.89 ACUTE BACTERIAL SINUSITIS: Status: ACTIVE | Noted: 2018-08-21

## 2025-04-30 PROBLEM — I21.9 HEART ATTACK: Status: ACTIVE | Noted: 2025-04-30

## 2025-04-30 LAB
ALBUMIN SERPL-MCNC: 4.4 G/DL (ref 3.5–5.2)
ALBUMIN/GLOB SERPL: 1.4 G/DL
ALP SERPL-CCNC: 57 U/L (ref 39–117)
ALT SERPL W P-5'-P-CCNC: 11 U/L (ref 1–41)
ANION GAP SERPL CALCULATED.3IONS-SCNC: 10.5 MMOL/L (ref 5–15)
AST SERPL-CCNC: 17 U/L (ref 1–40)
BASOPHILS # BLD AUTO: 0.03 10*3/MM3 (ref 0–0.2)
BASOPHILS NFR BLD AUTO: 0.6 % (ref 0–1.5)
BILIRUB SERPL-MCNC: 0.8 MG/DL (ref 0–1.2)
BUN SERPL-MCNC: 13 MG/DL (ref 8–23)
BUN/CREAT SERPL: 15.3 (ref 7–25)
CALCIUM SPEC-SCNC: 9.4 MG/DL (ref 8.6–10.5)
CHLORIDE SERPL-SCNC: 102 MMOL/L (ref 98–107)
CHOLEST SERPL-MCNC: 141 MG/DL (ref 0–200)
CO2 SERPL-SCNC: 23.5 MMOL/L (ref 22–29)
CREAT SERPL-MCNC: 0.85 MG/DL (ref 0.76–1.27)
DEPRECATED RDW RBC AUTO: 38.4 FL (ref 37–54)
EGFRCR SERPLBLD CKD-EPI 2021: 97 ML/MIN/1.73
EOSINOPHIL # BLD AUTO: 0.05 10*3/MM3 (ref 0–0.4)
EOSINOPHIL NFR BLD AUTO: 0.9 % (ref 0.3–6.2)
ERYTHROCYTE [DISTWIDTH] IN BLOOD BY AUTOMATED COUNT: 12.1 % (ref 12.3–15.4)
GLOBULIN UR ELPH-MCNC: 3.1 GM/DL
GLUCOSE SERPL-MCNC: 87 MG/DL (ref 65–99)
HCT VFR BLD AUTO: 39.2 % (ref 37.5–51)
HDLC SERPL-MCNC: 53 MG/DL (ref 40–60)
HGB BLD-MCNC: 13.1 G/DL (ref 13–17.7)
IMM GRANULOCYTES # BLD AUTO: 0.02 10*3/MM3 (ref 0–0.05)
IMM GRANULOCYTES NFR BLD AUTO: 0.4 % (ref 0–0.5)
LDLC SERPL CALC-MCNC: 69 MG/DL (ref 0–100)
LDLC/HDLC SERPL: 1.28 {RATIO}
LYMPHOCYTES # BLD AUTO: 1.38 10*3/MM3 (ref 0.7–3.1)
LYMPHOCYTES NFR BLD AUTO: 26 % (ref 19.6–45.3)
MCH RBC QN AUTO: 29.2 PG (ref 26.6–33)
MCHC RBC AUTO-ENTMCNC: 33.4 G/DL (ref 31.5–35.7)
MCV RBC AUTO: 87.3 FL (ref 79–97)
MONOCYTES # BLD AUTO: 0.3 10*3/MM3 (ref 0.1–0.9)
MONOCYTES NFR BLD AUTO: 5.7 % (ref 5–12)
NEUTROPHILS NFR BLD AUTO: 3.52 10*3/MM3 (ref 1.7–7)
NEUTROPHILS NFR BLD AUTO: 66.4 % (ref 42.7–76)
NRBC BLD AUTO-RTO: 0 /100 WBC (ref 0–0.2)
PLATELET # BLD AUTO: 187 10*3/MM3 (ref 140–450)
PMV BLD AUTO: 10.5 FL (ref 6–12)
POTASSIUM SERPL-SCNC: 4.5 MMOL/L (ref 3.5–5.2)
PROT SERPL-MCNC: 7.5 G/DL (ref 6–8.5)
RBC # BLD AUTO: 4.49 10*6/MM3 (ref 4.14–5.8)
SODIUM SERPL-SCNC: 136 MMOL/L (ref 136–145)
TRIGL SERPL-MCNC: 102 MG/DL (ref 0–150)
VLDLC SERPL-MCNC: 19 MG/DL (ref 5–40)
WBC NRBC COR # BLD AUTO: 5.3 10*3/MM3 (ref 3.4–10.8)

## 2025-04-30 PROCEDURE — 80053 COMPREHEN METABOLIC PANEL: CPT | Performed by: STUDENT IN AN ORGANIZED HEALTH CARE EDUCATION/TRAINING PROGRAM

## 2025-04-30 PROCEDURE — 80061 LIPID PANEL: CPT | Performed by: STUDENT IN AN ORGANIZED HEALTH CARE EDUCATION/TRAINING PROGRAM

## 2025-04-30 PROCEDURE — 85025 COMPLETE CBC W/AUTO DIFF WBC: CPT | Performed by: STUDENT IN AN ORGANIZED HEALTH CARE EDUCATION/TRAINING PROGRAM

## 2025-04-30 RX ORDER — NEBIVOLOL 10 MG/1
10 TABLET ORAL DAILY
Qty: 90 TABLET | Refills: 1 | Status: SHIPPED | OUTPATIENT
Start: 2025-04-30 | End: 2025-10-27

## 2025-04-30 RX ORDER — ROSUVASTATIN CALCIUM 40 MG/1
40 TABLET, COATED ORAL NIGHTLY
Qty: 90 TABLET | Refills: 2 | Status: SHIPPED | OUTPATIENT
Start: 2025-04-30 | End: 2026-01-25

## 2025-04-30 NOTE — PROGRESS NOTES
"Chief Complaint  Follow-up    Subjective          José Antonio Rabago presents to [facility]  History of Present Illness      History of Present Illness  The patient presents for evaluation of diverticulitis, hypertension, and medication management.    He reports a satisfactory recovery from his recent episode of diverticulitis. Blood pressure has been monitored at home and remains within normal limits. A weight gain of 35 pounds is noted, attributed to an improved appetite. He reports ankle swelling, which he believes is due to prolonged standing at his new job. Work hours have been reduced from full-time to 25 hours per week. No respiratory issues are reported, and lung function is maintained. He is currently on a regimen of low-dose aspirin.    He is seeking refills for his medications, rosuvastatin and Bystolic, as he only has one dose remaining of each. Flexeril is used occasionally as needed.      Current Outpatient Medications   Medication Instructions    cyclobenzaprine (FLEXERIL) 5 mg, Oral, Nightly PRN    LOW-DOSE ASPIRIN PO 81 mg, Daily    nebivolol (BYSTOLIC) 10 mg, Oral, Daily    rosuvastatin (CRESTOR) 40 mg, Oral, Nightly       The following portions of the patient's history were reviewed and updated as appropriate: allergies, current medications, past family history, past medical history, past social history, past surgical history, and problem list.    Objective   Vital Signs:   /76 (BP Location: Left arm, Patient Position: Sitting)   Pulse 50   Temp 97.9 °F (36.6 °C) (Oral)   Ht 177.8 cm (70\")   Wt 108 kg (237 lb 12.8 oz)   SpO2 97%   BMI 34.12 kg/m²     BP Readings from Last 3 Encounters:   04/30/25 126/76   01/22/25 130/80   06/06/24 146/86     Wt Readings from Last 3 Encounters:   04/30/25 108 kg (237 lb 12.8 oz)   01/22/25 91.6 kg (202 lb)   06/06/24 91.8 kg (202 lb 6.4 oz)     BMI is >= 30 and <35. (Class 1 Obesity). The following options were offered " after discussion;: weight loss educational material (shared in after visit summary), exercise counseling/recommendations, nutrition counseling/recommendations, and pharmacological intervention options     Physical Exam  Constitutional:       General: He is not in acute distress.     Appearance: Normal appearance. He is not toxic-appearing.   HENT:      Head: Normocephalic and atraumatic.      Right Ear: Tympanic membrane normal.      Left Ear: Tympanic membrane normal.      Nose: Nose normal.      Mouth/Throat:      Mouth: Mucous membranes are moist.   Eyes:      General: No scleral icterus.     Extraocular Movements: Extraocular movements intact.      Conjunctiva/sclera: Conjunctivae normal.      Pupils: Pupils are equal, round, and reactive to light.   Cardiovascular:      Rate and Rhythm: Normal rate and regular rhythm.      Pulses: Normal pulses.      Heart sounds: Normal heart sounds. No murmur heard.     No gallop.   Pulmonary:      Effort: Pulmonary effort is normal. No respiratory distress.   Abdominal:      General: Abdomen is flat. Bowel sounds are normal. There is no distension.      Palpations: Abdomen is soft.   Musculoskeletal:         General: Normal range of motion.      Cervical back: Normal range of motion.   Skin:     General: Skin is warm and dry.      Capillary Refill: Capillary refill takes less than 2 seconds.   Neurological:      General: No focal deficit present.      Mental Status: He is alert.   Psychiatric:         Mood and Affect: Mood normal.            Result Review :   The following data was reviewed by: Darren Pascal MD on 04/30/2025:  Common labs          5/15/2024    08:21   Common Labs   Glucose 101    BUN 9    Creatinine 0.84    Sodium 138    Potassium 4.4    Chloride 102    Calcium 9.8    Albumin 4.9    Total Bilirubin 0.7    Alkaline Phosphatase 62    AST (SGOT) 22    ALT (SGPT) 21    WBC 5.81    Hemoglobin 14.4    Hematocrit 42.8    Platelets 186    Total Cholesterol 114     Triglycerides 94    HDL Cholesterol 44    LDL Cholesterol  52             Lab Results   Component Value Date    INR 0.95 (L) 10/27/2020    BILIRUBINUR Negative 04/10/2018       Procedures        Assessment and Plan    Diagnoses and all orders for this visit:    1. Mixed hyperlipidemia (Primary)  -     Lipid panel; Future    2. Hypertension, unspecified type  -     CBC w AUTO Differential; Future          Assessment & Plan  1. Diverticulitis.  - Reports resolution of diverticulitis symptoms and is operating well.  - No current symptoms were noted.    2. Hypertension.  - Blood pressure has been stable, monitored at home with a blood pressure cuff.  - Physical exam indicates no pitting edema despite ankle swelling.  - Advised to continue current medication regimen and maintain regular monitoring.    3. Medication Management.  - Refills for rosuvastatin and Bystolic sent to pharmacy.  - Flexeril taken as needed.  - Prescription Drug Monitoring Program was reviewed.    4. Health Maintenance.  - Lipid panel to be conducted today as patient is fasting.  - CT scan, initially ordered in 01/2025, needs to be rescheduled.  - Advised to reduce soda intake and cut down on carbohydrates to manage weight gain.    Follow-up in 3 months.      Medications Discontinued During This Encounter   Medication Reason    azithromycin (Zithromax Z-Jasper) 250 MG tablet *Therapy completed    valsartan (Diovan) 80 MG tablet *Therapy completed          Follow Up   No follow-ups on file.  Patient was given instructions and counseling regarding his condition or for health maintenance advice. Please see specific information pulled into the AVS if appropriate.       Darren Pascal MD  04/30/25  11:16 EDT      Patient or patient representative verbalized consent for the use of Ambient Listening during the visit with  Darren Pascal MD for chart documentation. 4/30/2025  11:16 EDT